# Patient Record
(demographics unavailable — no encounter records)

---

## 2024-11-22 NOTE — HISTORY OF PRESENT ILLNESS
[de-identified] : headache and nausea  [FreeTextEntry6] : 15 year old male presenting with headache and nausea. Pt reports that his symptoms began around lunch time. Pt did not eat lunch. Pain 7/10. Pain is all over.  Pt denies vomiting. Pt denies sensitivity to light or sound. Pt denies neck pain or changes with vision.   Pt ate a bagel with cream cheese for breakfast. Pt drank water today. Pt slept from 8 pm to 5 am. Pt had a sleep study in October 2024. Pt often has daytime sleepiness. Pt is awaiting results.   Pt denies increased stressors at home or at school.   Pt reports that he gets headaches about 2 times per month. Headaches are often associated with nausea. Pt typically takes Ibuproen or Acetaminophen.

## 2024-11-22 NOTE — HISTORY OF PRESENT ILLNESS
[de-identified] : headache and nausea  [FreeTextEntry6] : 15 year old male presenting with headache and nausea. Pt reports that his symptoms began around lunch time. Pt did not eat lunch. Pain 7/10. Pain is all over.  Pt denies vomiting. Pt denies sensitivity to light or sound. Pt denies neck pain or changes with vision.   Pt ate a bagel with cream cheese for breakfast. Pt drank water today. Pt slept from 8 pm to 5 am. Pt had a sleep study in October 2024. Pt often has daytime sleepiness. Pt is awaiting results.   Pt denies increased stressors at home or at school.   Pt reports that he gets headaches about 2 times per month. Headaches are often associated with nausea. Pt typically takes Ibuproen or Acetaminophen.

## 2024-11-22 NOTE — DISCUSSION/SUMMARY
[FreeTextEntry1] : 15 year old male presenting with acute headache.   -Acetaminophen 325 mg 2 tabs po x 1 dispensed. Snack offered. -Counseled re: SMART headache management: sleep 8-9 hours per night, eat regular meals including breakfast, increase hydration, exercise regularly, reduce stress, and avoid triggers. -Advised pt to follow-up with doctor regarding results of sleep study. -Return to health center as needed if headaches increase in severity or frequency.

## 2024-11-22 NOTE — RISK ASSESSMENT
[Grade: ____] : Grade: [unfilled] [Uses tobacco] : does not use tobacco [Uses drugs] : does not use drugs  [Drinks alcohol] : does not drink alcohol [Has had sexual intercourse] : has not had sexual intercourse [Gets depressed, anxious, or irritable/has mood swings] : does not get depressed, anxious, or irritable/has mood swings [Has thought about hurting self or considered suicide] : has not thought about hurting self or considered suicide [de-identified] : lives with mother - feels safe at home  [de-identified] : attends [employer] - going well but does not enjoy school  [de-identified] : attracted to girls  [de-identified] : attends therapy online

## 2024-11-22 NOTE — RISK ASSESSMENT
[Grade: ____] : Grade: [unfilled] [Uses tobacco] : does not use tobacco [Uses drugs] : does not use drugs  [Drinks alcohol] : does not drink alcohol [Has had sexual intercourse] : has not had sexual intercourse [Gets depressed, anxious, or irritable/has mood swings] : does not get depressed, anxious, or irritable/has mood swings [Has thought about hurting self or considered suicide] : has not thought about hurting self or considered suicide [de-identified] : lives with mother - feels safe at home  [de-identified] : attends [employer] - going well but does not enjoy school  [de-identified] : attracted to girls  [de-identified] : attends therapy online

## 2024-11-22 NOTE — PHYSICAL EXAM
[Tenderness] : tenderness [Soft] : soft [Normal Bowel Sounds] : normal bowel sounds [NL] : normotonic [Normotonic] : normotonic [+2 Patella DTR] : +2 patella DTR [Tender] : nontender [Distended] : nondistended [Hepatosplenomegaly] : no hepatosplenomegaly [FreeTextEntry2] : DELMIS